# Patient Record
Sex: FEMALE | Race: WHITE | Employment: OTHER | URBAN - METROPOLITAN AREA
[De-identification: names, ages, dates, MRNs, and addresses within clinical notes are randomized per-mention and may not be internally consistent; named-entity substitution may affect disease eponyms.]

---

## 2017-09-06 ENCOUNTER — HOSPITAL ENCOUNTER (OUTPATIENT)
Dept: MAMMOGRAPHY | Age: 57
Discharge: HOME | End: 2017-09-06
Attending: OBSTETRICS & GYNECOLOGY
Payer: COMMERCIAL

## 2017-09-06 DIAGNOSIS — Z12.31 VISIT FOR SCREENING MAMMOGRAM: ICD-10-CM

## 2017-09-06 PROCEDURE — 77063 BREAST TOMOSYNTHESIS BI: CPT

## 2018-12-13 ENCOUNTER — HOSPITAL ENCOUNTER (OUTPATIENT)
Dept: MAMMOGRAPHY | Age: 58
Discharge: HOME | End: 2018-12-13
Attending: OBSTETRICS & GYNECOLOGY
Payer: COMMERCIAL

## 2018-12-13 DIAGNOSIS — Z12.31 VISIT FOR SCREENING MAMMOGRAM: ICD-10-CM

## 2018-12-13 PROCEDURE — 77063 BREAST TOMOSYNTHESIS BI: CPT

## 2018-12-21 ENCOUNTER — HOSPITAL ENCOUNTER (OUTPATIENT)
Dept: MAMMOGRAPHY | Age: 58
Discharge: HOME | End: 2018-12-21
Attending: OBSTETRICS & GYNECOLOGY
Payer: COMMERCIAL

## 2018-12-21 DIAGNOSIS — R92.8 ABNORMAL SCREENING MAMMOGRAM: ICD-10-CM

## 2018-12-21 PROCEDURE — 76642 ULTRASOUND BREAST LIMITED: CPT

## 2019-06-11 ENCOUNTER — HOSPITAL ENCOUNTER (OUTPATIENT)
Dept: LAB | Age: 59
Discharge: HOME | End: 2019-06-11

## 2019-06-11 PROCEDURE — 88305 TISSUE EXAM BY PATHOLOGIST: CPT

## 2020-01-29 ENCOUNTER — HOSPITAL ENCOUNTER (OUTPATIENT)
Dept: MAMMOGRAPHY | Age: 60
Discharge: HOME | End: 2020-01-29
Attending: OBSTETRICS & GYNECOLOGY
Payer: COMMERCIAL

## 2020-01-29 DIAGNOSIS — Z12.31 SCREENING MAMMOGRAM, ENCOUNTER FOR: ICD-10-CM

## 2020-01-29 PROCEDURE — 77063 BREAST TOMOSYNTHESIS BI: CPT

## 2021-01-04 ENCOUNTER — TRANSCRIBE ORDER (OUTPATIENT)
Dept: SCHEDULING | Age: 61
End: 2021-01-04

## 2021-01-04 DIAGNOSIS — Z12.31 SCREENING MAMMOGRAM FOR HIGH-RISK PATIENT: Primary | ICD-10-CM

## 2021-02-01 ENCOUNTER — HOSPITAL ENCOUNTER (OUTPATIENT)
Dept: MAMMOGRAPHY | Age: 61
Discharge: HOME | End: 2021-02-01
Attending: OBSTETRICS & GYNECOLOGY
Payer: COMMERCIAL

## 2021-02-01 DIAGNOSIS — Z12.31 SCREENING MAMMOGRAM FOR HIGH-RISK PATIENT: ICD-10-CM

## 2021-02-01 PROCEDURE — 77063 BREAST TOMOSYNTHESIS BI: CPT

## 2022-03-16 ENCOUNTER — TRANSCRIBE ORDER (OUTPATIENT)
Dept: SCHEDULING | Age: 62
End: 2022-03-16

## 2022-03-16 DIAGNOSIS — Z12.31 SCREENING MAMMOGRAM FOR HIGH-RISK PATIENT: Primary | ICD-10-CM

## 2022-04-01 ENCOUNTER — HOSPITAL ENCOUNTER (OUTPATIENT)
Dept: MAMMOGRAPHY | Age: 62
Discharge: HOME | End: 2022-04-01
Attending: OBSTETRICS & GYNECOLOGY
Payer: COMMERCIAL

## 2022-04-01 DIAGNOSIS — Z12.31 SCREENING MAMMOGRAM FOR HIGH-RISK PATIENT: ICD-10-CM

## 2022-04-01 PROCEDURE — 77063 BREAST TOMOSYNTHESIS BI: CPT

## 2022-09-09 ENCOUNTER — OFFICE VISIT (OUTPATIENT)
Dept: GYNECOLOGY | Age: 62
End: 2022-09-09
Payer: COMMERCIAL

## 2022-09-09 VITALS
SYSTOLIC BLOOD PRESSURE: 118 MMHG | WEIGHT: 123 LBS | BODY MASS INDEX: 21.79 KG/M2 | HEIGHT: 63 IN | DIASTOLIC BLOOD PRESSURE: 72 MMHG

## 2022-09-09 DIAGNOSIS — Z01.419 ENCOUNTER FOR WELL WOMAN EXAM WITH ROUTINE GYNECOLOGICAL EXAM: Primary | ICD-10-CM

## 2022-09-09 DIAGNOSIS — Z12.31 SCREENING MAMMOGRAM, ENCOUNTER FOR: ICD-10-CM

## 2022-09-09 PROCEDURE — 99396 PREV VISIT EST AGE 40-64: CPT | Performed by: OBSTETRICS & GYNECOLOGY

## 2022-09-09 NOTE — PROGRESS NOTES
HPI  Melanie Hollins is a 58 y.o. female seen for annual GYN exam.      Past Medical History, Past Surgical History, Family history, Social History, and Medications were all reviewed with the patient today and updated as necessary. Current Outpatient Medications   Medication Sig    linaCLOtide (LINZESS) 72 MCG CAPS capsule Take 72 mcg by mouth daily     No current facility-administered medications for this visit. No Known Allergies  Past Medical History:   Diagnosis Date    Constipation      Past Surgical History:   Procedure Laterality Date    COLONOSCOPY          HYSTERECTOMY (CERVIX STATUS UNKNOWN)      Abdominal. Both ovaries intact     Family History   Problem Relation Age of Onset    Heart Disease Mother     Kidney Disease Mother     Kidney Disease Father         Dialysis    Heart Disease Father     Cancer Brother         Bood cancer    Breast Cancer Neg Hx       Social History     Tobacco Use    Smoking status: Never    Smokeless tobacco: Never   Substance Use Topics    Alcohol use: Yes       Social History     Substance and Sexual Activity   Sexual Activity Not on file     OB History    Para Term  AB Living   2 2 0 0 0 0   SAB IAB Ectopic Molar Multiple Live Births   0 0 0 0 0 0      # Outcome Date GA Lbr Lev/2nd Weight Sex Delivery Anes PTL Lv   2 Para            1 Para                Health Maintenance  Mammogram: 22  Colonoscopy: 2019  Bone Density:none   Pap smear:hysterectomy still has ovaries      Review of Systems  General: Not Present- Chills, Fever, Fatigue, Insomnia, Hot flashes/Night sweats, Weight gain  Skin: Not Present- Bruising, Change in Wart/Mole, Excessive Sweating, Itching, Nail Changes, New Lesions, Rash, Skin Color Changes and Ulcer. HEENT: Not Present- Headache, Blurred Vision, Double Vision, Glaucoma, Visual Disturbances, Hearing Loss, Ringing in the Ears, Vertigo, Nose Bleed, Bleeding Gums, Hoarseness and Sore Throat.   Neck: Not Present- Neck Pain vocal resonance. Breast   Breast - Left - Normal. Right - Normal.     Cardiovascular   Cardiovascular examination reveals - normal heart sounds, regular rate and rhythm with no murmurs. Abdomen   Inspection: - Inspection Normal.   Palpation/Percussion: Palpation and Percussion of the abdomen reveal - Non Tender, No Rebound tenderness, No Rigidity (guarding), No hepatosplenomegaly, No Palpable abdominal masses and Soft. Auscultation: Auscultation of the abdomen reveals - Bowel sounds normal.     Female Genitourinary     External Genitalia   Vulva: - Normal. Perineum - Normal. Bartholin's Gland - Bilateral - Normal. Clitoris - Normal.   Introitus: Characteristics - Normal.   Urethra: Characteristics - Normal.     Speculum & Bimanual   Vagina: Vaginal Mucosa - Normal  Vaginal Wall: - Normal.   Vaginal Lesions - None. Cervix: Characteristics - Surgically absent  Uterus: Characteristics - Surgically absent  Adnexa: - Normal.   Bladder - Normal.     Peripheral Vascular   Normal    Neuropsychiatric   Examination of related systems reveals - The patient is well-nourished and well-groomed. Mental status exam performed with findings of - Oriented X3 with appropriate mood and affect. Musculoskeletal  Normal      General Lymphatics  Normal           Medical problems and test results were reviewed with the patient today. ASSESSMENT and PLAN    1. Encounter for well woman exam with routine gynecological exam  2. Screening mammogram, encounter for  -     [facility] DIGITAL SCREEN BILATERAL; Future         Return for AE made for 2023.         Kim Torres MD  9/9/2022

## 2023-04-03 ENCOUNTER — HOSPITAL ENCOUNTER (OUTPATIENT)
Dept: MAMMOGRAPHY | Age: 63
Discharge: HOME | End: 2023-04-06
Payer: COMMERCIAL

## 2023-04-03 DIAGNOSIS — Z12.31 SCREENING MAMMOGRAM, ENCOUNTER FOR: ICD-10-CM

## 2023-04-03 PROCEDURE — 77063 BREAST TOMOSYNTHESIS BI: CPT

## 2023-10-10 SDOH — ECONOMIC STABILITY: INCOME INSECURITY: HOW HARD IS IT FOR YOU TO PAY FOR THE VERY BASICS LIKE FOOD, HOUSING, MEDICAL CARE, AND HEATING?: NOT HARD AT ALL

## 2023-10-10 SDOH — ECONOMIC STABILITY: FOOD INSECURITY: WITHIN THE PAST 12 MONTHS, YOU WORRIED THAT YOUR FOOD WOULD RUN OUT BEFORE YOU GOT MONEY TO BUY MORE.: NEVER TRUE

## 2023-10-10 SDOH — ECONOMIC STABILITY: FOOD INSECURITY: WITHIN THE PAST 12 MONTHS, THE FOOD YOU BOUGHT JUST DIDN'T LAST AND YOU DIDN'T HAVE MONEY TO GET MORE.: NEVER TRUE

## 2023-10-10 SDOH — ECONOMIC STABILITY: HOUSING INSECURITY
IN THE LAST 12 MONTHS, WAS THERE A TIME WHEN YOU DID NOT HAVE A STEADY PLACE TO SLEEP OR SLEPT IN A SHELTER (INCLUDING NOW)?: NO

## 2023-10-10 SDOH — ECONOMIC STABILITY: TRANSPORTATION INSECURITY
IN THE PAST 12 MONTHS, HAS LACK OF TRANSPORTATION KEPT YOU FROM MEETINGS, WORK, OR FROM GETTING THINGS NEEDED FOR DAILY LIVING?: NO

## 2023-10-10 NOTE — PROGRESS NOTES
SIM Méndez is a 61 y.o. female seen for annual GYN exam.    Past Medical History, Past Surgical History, Family history, Social History, and Medications were all reviewed with the patient today and updated as necessary. Current Outpatient Medications   Medication Sig    linaCLOtide (LINZESS) 72 MCG CAPS capsule Take 1 capsule by mouth daily     No current facility-administered medications for this visit. No Known Allergies  Past Medical History:   Diagnosis Date    Constipation      Past Surgical History:   Procedure Laterality Date    COLONOSCOPY          HYSTERECTOMY, TOTAL ABDOMINAL (CERVIX REMOVED)      Abdominal. Both ovaries intact     Family History   Problem Relation Age of Onset    Heart Disease Mother     Kidney Disease Mother     Kidney Disease Father         Dialysis    Heart Disease Father     Cancer Brother         Multiple Myeloma (Genetic Mutation)    Breast Cancer Neg Hx       Social History     Tobacco Use    Smoking status: Never    Smokeless tobacco: Never   Substance Use Topics    Alcohol use: Yes     Comment: twice a week       Social History     Substance and Sexual Activity   Sexual Activity Not Currently    Birth control/protection: Surgical     OB History    Para Term  AB Living   2 2 0 0 0 0   SAB IAB Ectopic Molar Multiple Live Births   0 0 0 0 0 0      # Outcome Date GA Lbr Lev/2nd Weight Sex Delivery Anes PTL Lv   2 Para            1 Para                Health Maintenance  Mammogram: 4-3-23  Colonoscopy: 2019 Repeat   Bone Density:  Pap smear: Hysterectomy Ovaries intact      Review of Systems  General: Not Present- Chills, Fever, Fatigue, Insomnia, Hot flashes/Night sweats, Weight gain  Skin: Not Present- Bruising, Change in Wart/Mole, Excessive Sweating, Itching, Nail Changes, New Lesions, Rash, Skin Color Changes and Ulcer.   HEENT: Not Present- Headache, Blurred Vision, Double Vision, Glaucoma, Visual Disturbances, Hearing Loss, Ringing

## 2023-10-11 ENCOUNTER — OFFICE VISIT (OUTPATIENT)
Dept: OBGYN CLINIC | Age: 63
End: 2023-10-11
Payer: COMMERCIAL

## 2023-10-11 VITALS
WEIGHT: 119 LBS | DIASTOLIC BLOOD PRESSURE: 60 MMHG | SYSTOLIC BLOOD PRESSURE: 102 MMHG | BODY MASS INDEX: 20.32 KG/M2 | HEIGHT: 64 IN

## 2023-10-11 DIAGNOSIS — Z01.419 WELL WOMAN EXAM: Primary | ICD-10-CM

## 2023-10-11 PROCEDURE — 99396 PREV VISIT EST AGE 40-64: CPT | Performed by: OBSTETRICS & GYNECOLOGY

## 2024-03-06 ENCOUNTER — TRANSCRIBE ORDERS (OUTPATIENT)
Dept: SCHEDULING | Age: 64
End: 2024-03-06

## 2024-03-06 DIAGNOSIS — Z12.31 ENCOUNTER FOR SCREENING MAMMOGRAM FOR MALIGNANT NEOPLASM OF BREAST: Primary | ICD-10-CM

## 2024-04-12 ENCOUNTER — HOSPITAL ENCOUNTER (OUTPATIENT)
Dept: MAMMOGRAPHY | Age: 64
Discharge: HOME | End: 2024-04-12
Attending: OBSTETRICS & GYNECOLOGY
Payer: COMMERCIAL

## 2024-04-12 VITALS — WEIGHT: 120 LBS | HEIGHT: 64 IN | BODY MASS INDEX: 20.49 KG/M2

## 2024-04-12 DIAGNOSIS — Z12.31 ENCOUNTER FOR SCREENING MAMMOGRAM FOR MALIGNANT NEOPLASM OF BREAST: ICD-10-CM

## 2024-04-12 PROCEDURE — 77063 BREAST TOMOSYNTHESIS BI: CPT

## 2025-03-24 ENCOUNTER — TRANSCRIBE ORDERS (OUTPATIENT)
Dept: SCHEDULING | Age: 65
End: 2025-03-24

## 2025-03-24 DIAGNOSIS — Z12.31 VISIT FOR SCREENING MAMMOGRAM: Primary | ICD-10-CM

## 2025-05-06 SDOH — ECONOMIC STABILITY: FOOD INSECURITY: WITHIN THE PAST 12 MONTHS, THE FOOD YOU BOUGHT JUST DIDN'T LAST AND YOU DIDN'T HAVE MONEY TO GET MORE.: NEVER TRUE

## 2025-05-06 SDOH — ECONOMIC STABILITY: FOOD INSECURITY: WITHIN THE PAST 12 MONTHS, YOU WORRIED THAT YOUR FOOD WOULD RUN OUT BEFORE YOU GOT MONEY TO BUY MORE.: NEVER TRUE

## 2025-05-06 SDOH — ECONOMIC STABILITY: TRANSPORTATION INSECURITY
IN THE PAST 12 MONTHS, HAS THE LACK OF TRANSPORTATION KEPT YOU FROM MEDICAL APPOINTMENTS OR FROM GETTING MEDICATIONS?: NO

## 2025-05-06 SDOH — ECONOMIC STABILITY: INCOME INSECURITY: IN THE LAST 12 MONTHS, WAS THERE A TIME WHEN YOU WERE NOT ABLE TO PAY THE MORTGAGE OR RENT ON TIME?: NO

## 2025-05-06 NOTE — PROGRESS NOTES
HPI  Nasreen Coleman is a 64 y.o. female seen for annual GYN exam.    Past Medical History, Past Surgical History, Family history, Social History, and Medications were all reviewed with the patient today and updated as necessary.     Current Outpatient Medications   Medication Sig    desonide (DESOWEN) 0.05 % ointment     linaCLOtide (LINZESS) 72 MCG CAPS capsule Take 1 capsule by mouth daily     No current facility-administered medications for this visit.     No Known Allergies  Past Medical History:   Diagnosis Date    Constipation      Past Surgical History:   Procedure Laterality Date    COLONOSCOPY          HYSTERECTOMY, TOTAL ABDOMINAL (CERVIX REMOVED)      Abdominal. Both ovaries intact and bladder suspension     Family History   Problem Relation Age of Onset    Heart Disease Mother     Kidney Disease Mother     Kidney Disease Father         Dialysis    Heart Disease Father     Cancer Brother         Multiple Myeloma (Genetic Mutation)    Breast Cancer Neg Hx       Social History     Tobacco Use    Smoking status: Never    Smokeless tobacco: Never   Substance Use Topics    Alcohol use: Yes     Comment: twice a week       Social History     Substance and Sexual Activity   Sexual Activity Not Currently    Birth control/protection: Surgical     OB History    Para Term  AB Living   2 2 2 0 0 0   SAB IAB Ectopic Molar Multiple Live Births   0 0 0 0 0 2      # Outcome Date GA Lbr Lev/2nd Weight Sex Type Anes PTL Lv   2 Term            1 Term                Health Maintenance  Mammogram:- 2024 Scheduled May 24  Colonoscopy:   Prep not cleared Scheduled on 25  Bone Density:  Pap smear: Hysterectomy Ovaries intact    Review of Systems  General: Not Present- Fatigue, Insomnia, Hot flashes/Night sweats, Weight gain, Brain fog  Breast: Not Present- Breast Mass, Breast Pain, Breast Swelling, Nipple Discharge, Nipple Pain, Recent Breast Size Changes and Skin Changes.  Gastrointestinal: Not

## 2025-05-08 ENCOUNTER — OFFICE VISIT (OUTPATIENT)
Dept: OBGYN CLINIC | Age: 65
End: 2025-05-08
Payer: COMMERCIAL

## 2025-05-08 VITALS
SYSTOLIC BLOOD PRESSURE: 110 MMHG | HEIGHT: 64 IN | DIASTOLIC BLOOD PRESSURE: 80 MMHG | BODY MASS INDEX: 20.14 KG/M2 | WEIGHT: 118 LBS

## 2025-05-08 DIAGNOSIS — Z01.419 WELL WOMAN EXAM: Primary | ICD-10-CM

## 2025-05-08 DIAGNOSIS — Z78.0 POST-MENOPAUSAL: ICD-10-CM

## 2025-05-08 PROCEDURE — 99396 PREV VISIT EST AGE 40-64: CPT | Performed by: OBSTETRICS & GYNECOLOGY

## 2025-05-08 PROCEDURE — 99459 PELVIC EXAMINATION: CPT | Performed by: OBSTETRICS & GYNECOLOGY

## 2025-05-08 RX ORDER — DESONIDE 0.5 MG/G
OINTMENT TOPICAL
COMMUNITY
Start: 2025-04-09

## 2025-05-24 ENCOUNTER — HOSPITAL ENCOUNTER (OUTPATIENT)
Dept: MAMMOGRAPHY | Age: 65
Discharge: HOME | End: 2025-05-27
Attending: OBSTETRICS & GYNECOLOGY
Payer: COMMERCIAL

## 2025-05-24 DIAGNOSIS — Z12.31 VISIT FOR SCREENING MAMMOGRAM: ICD-10-CM

## 2025-05-24 PROCEDURE — 77063 BREAST TOMOSYNTHESIS BI: CPT
